# Patient Record
Sex: FEMALE
[De-identification: names, ages, dates, MRNs, and addresses within clinical notes are randomized per-mention and may not be internally consistent; named-entity substitution may affect disease eponyms.]

---

## 2017-09-26 ENCOUNTER — HOSPITAL ENCOUNTER (EMERGENCY)
Dept: HOSPITAL 42 - ED | Age: 67
Discharge: ANOTHER HOSPITAL | End: 2017-09-26
Payer: MEDICARE

## 2017-09-26 VITALS — RESPIRATION RATE: 18 BRPM

## 2017-09-26 VITALS — OXYGEN SATURATION: 95 %

## 2017-09-26 VITALS — TEMPERATURE: 98.1 F

## 2017-09-26 VITALS — SYSTOLIC BLOOD PRESSURE: 152 MMHG | DIASTOLIC BLOOD PRESSURE: 79 MMHG | HEART RATE: 58 BPM

## 2017-09-26 VITALS — BODY MASS INDEX: 29.9 KG/M2

## 2017-09-26 DIAGNOSIS — R55: Primary | ICD-10-CM

## 2017-09-26 DIAGNOSIS — I10: ICD-10-CM

## 2017-09-26 DIAGNOSIS — R00.2: ICD-10-CM

## 2017-09-26 LAB
ALBUMIN/GLOB SERPL: 1.5 {RATIO} (ref 1.1–1.8)
ALP SERPL-CCNC: 62 U/L (ref 38–126)
ALT SERPL-CCNC: 23 U/L (ref 7–56)
APTT BLD: 27 SECONDS (ref 23.7–30.8)
AST SERPL-CCNC: 18 U/L (ref 14–36)
BILIRUB SERPL-MCNC: 0.4 MG/DL (ref 0.2–1.3)
BUN SERPL-MCNC: 24 MG/DL (ref 7–21)
CALCIUM SERPL-MCNC: 9.1 MG/DL (ref 8.4–10.5)
CHLORIDE SERPL-SCNC: 106 MMOL/L (ref 98–107)
CO2 SERPL-SCNC: 25 MMOL/L (ref 21–33)
ERYTHROCYTE [DISTWIDTH] IN BLOOD BY AUTOMATED COUNT: 13.1 % (ref 11.5–14.5)
GLOBULIN SER-MCNC: 2.6 GM/DL
GLUCOSE SERPL-MCNC: 97 MG/DL (ref 70–110)
HCT VFR BLD CALC: 39.6 % (ref 36–48)
INR PPP: 0.94 (ref 0.93–1.08)
MCH RBC QN AUTO: 30.4 PG (ref 25–35)
MCHC RBC AUTO-ENTMCNC: 34.3 G/DL (ref 31–37)
MCV RBC AUTO: 88.6 FL (ref 80–105)
PLATELET # BLD: 239 10^3/UL (ref 120–450)
PMV BLD AUTO: 9.8 FL (ref 7–11)
POTASSIUM SERPL-SCNC: 4 MMOL/L (ref 3.6–5)
PROT SERPL-MCNC: 6.4 G/DL (ref 5.8–8.3)
SODIUM SERPL-SCNC: 140 MMOL/L (ref 132–148)
TROPONIN I SERPL-MCNC: < 0.01 NG/ML
WBC # BLD AUTO: 7.6 10^3/UL (ref 4.5–11)

## 2017-09-26 NOTE — CON
DATE:  09/26/2017



INDICATIONS:  Near syncope, weakness, palpitations.



HISTORY OF PRESENT ILLNESS:  This is a 66-year-old woman known to me

who developed weakness and near-syncope yesterday while working in a hot

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

She felt diaphoretic and despite drinking water, her symptoms

worsened.  She took a cab home.  She continued to feel lightheaded and

weak.  She came to the emergency room and was admitted.  She received

IV fluids and this morning she feels much better.  There was no chest

pain.  There was palpitation.  There was no orthopnea, PND, syncope,

fall, injury, edema, claudication, fever, chills, cough, sputum

production, hemoptysis, abdominal pain, nausea, vomiting, diarrhea,

constipation, or melena.



PAST MEDICAL HISTORY:  Notable for hypertension.  There is no history

of rheumatic fever, myocardial infarction, angina, congestive heart

failure, diabetes, stroke, TIA, or gout.



MEDICATIONS AT THE TIME OF ADMISSION:  Amlodipine.



ALLERGIES:  SULFA MEDICATIONS.



SOCIAL HISTORY:  She lives at home.  She is employed.  She is

ambulatory.  She never smoked.  She does not drink alcohol

significantly.



REVIEW OF SYSTEMS:  10-point review of systems is otherwise

unremarkable except as noted above.



PHYSICAL EXAMINATION:

GENERAL:  She is a well-developed woman, in no acute distress.

VITAL SIGNS:  Notable for sinus rhythm 56 beats per minute, blood

pressure 154/88, respirations 18 to 25, and O2 sat 95-96% on room air.

HEENT:  Exam reveals no neck vein distention, thyromegaly, or carotid

bruits.  Mucous membranes are moist.  Conjunctivae are pink.

NECK:  Supple.

RESPIRATORY:  Lung fields clear throughout.

CARDIOVASCULAR:  Examination of the heart reveals normal first and

second heart sounds.

ABDOMEN:  Soft.  Bowel sounds present.  No mass, organomegaly,

tenderness, rebound, or guarding.  No CVA tenderness.  No palpable

abdominal aortic aneurysm.

EXTREMITIES:  Exam revealed no cyanosis, clubbing, or edema.

NEUROLOGIC:  She was awake, alert, and oriented.

PSYCHIATRIC:  Normal as to mood and affect.

SKIN:  Warm and dry.  No rash or cellulitis.



LABORATORY AND IMAGING:  A portable chest x-ray revealed no active

disease.  A CT scan of the head is noted.  No evidence of acute

stroke.  EKG demonstrates sinus rhythm.  T-here is a limb lead

reversal.  No acute ST changes are noted.  CBC is unremarkable.  PT,

INR, PTT unremarkable.  Comprehensive metabolic panel unremarkable. 

, troponin less than 0.01.



IMPRESSION AND PLAN:  The patient is a 66-year-old woman who was

exposed to extreme heat in workplace yesterday and developed

diaphoresis, weakness, and near-syncope with palpitations but no chest

pain.  Symptoms have resolved with an overnight stay in the emergency

room, IV fluids and blood tests are unremarkable.  There is no

evidence of arrhythmia or acute myocardial infarction.



Her initial EKG was benign, but there is limb lead reversal.  I will

repeat the EKG this morning.  I will check her for postural signs.  I

will review her old records.  She will report additional symptoms if

they occur.  She will avoid heat exposure and keep well hydrated.  She

is reluctant to have a stress test, but I will review her records to

see if one is advisable and I will discuss it further with you on an

outpatient basis.







__________________________________________

Jeff Munson MD





DD:  09/26/2017 10:10:43

DT:  09/26/2017 11:25:29

Job # [id number]



MTDD

## 2017-09-26 NOTE — CT
EXAM:

  CT Head Without Intravenous Contrast



CLINICAL HISTORY:

  66 years old, female; Signs and symptoms; Syncope and collapse; Additional 

info: Near syncope



TECHNIQUE:

  Axial computed tomography images of the head/brain without intravenous 

contrast.  All CT scans at this facility use one or more dose reduction 

techniques, viz.: automated exposure control; ma/kV adjustment per patient size 

(including targeted exams where dose is matched to indication; i.e. head); or 

iterative reconstruction technique.



COMPARISON:

  No relevant prior studies available.



FINDINGS:

  Brain:  Mild atrophy.  No intracranial hemorrhage.  No mass.  No definite 

edema.

  Ventricles:  No hydrocephalus.

  Bones/joints:  No acute fracture.

  Soft tissues:  Unremarkable.

  Vasculature:  Mild atherosclerotic disease of intracranial arteries.

  Sinuses:  Few sphenoid retention cysts.

  Mastoid air cells:  No mastoid effusion.

  Orbits:  Unremarkable as visualized.



IMPRESSION:     

1.  No acute intracranial abnormality.  Acute infarction may be CT occult 

within first 24 hours.  If a focal deficit persists, consider followup CT or 

MRI for further evaluation.

2.  Incidental/non-acute findings are described above.

## 2017-09-26 NOTE — CARD
--------------- APPROVED REPORT --------------





EKG Measurement

Heart [id number]

OH [id number]

[id number]

[id number]

[id number]



<Conclusion>

Sinus bradycardia

Leftward axis

NSSTW changes

## 2017-09-26 NOTE — CARD
--------------- APPROVED REPORT --------------





EKG Measurement

Heart [id number]

[id number]

[id number]

[id number]

[id number]



<Conclusion>

*** Suspect arm lead reversal.



Normal sinus rhythm

## 2017-09-26 NOTE — CON
Went to see the patient who was admitted with near syncope.  The patient

said that recently she started seeing Dr. Munson, used to see before Dr. Lujan, but now recently, she saw Dr. Munson, couple of months and an echo

and a stress test are scheduled with him, so we will sign off consult for

her and we will put Dr. Munson for the consult.  We ordered blood test

earlier, we will cancel and echo will be canceled and further

recommendation as per Dr. Munson.







__________________________________________

Janeth Alcantara MD





DD:  09/26/2017 8:09:20

DT:  09/26/2017 8:22:38

Job # [id number]

## 2017-09-26 NOTE — ED PDOC
Arrival/HPI





- General


Chief Complaint: Weakness/Neurological Deficit


Time Seen by Provider: 09/26/17 00:21


Historian: Patient





- History of Present Illness


Narrative History of Present Illness (Text): 


09/26/17 00:26


Eielen Henson is a 66 year old female, whose past medical history includes 

hypertension, who presents to the Emergency department complaining of 

generalized weakness. Patient states she was in a hot room at work yesterday 

and began feeling weak and near syncopal with palpitations while driving home 

from work. Patient states today she still feels weak with associated 

generalized malaise and headache. Patient denies any fever, chills, chest pain, 

shortness of breath, vomiting, diarrhea, urinary symptoms, back pain, neck pain

, dizziness, or any other complaints.





PMD: Dr. Art


Symptom Onset: Gradual


Symptom Course: Unchanged


Activities at Onset: Light


Context: Home





Past Medical History





- Provider Review


Nursing Documentation Reviewed: Yes





- Infectious Disease


Hx of Infectious Diseases: None





- Tetanus Immunization


Tetanus Immunization: Unknown





- Cardiac


Hx Hypertension: Yes





- Musculoskeletal/Rheumatological


Hx Falls: No





- Gastrointestinal


Hx Gastrointestinal Disorders: Yes (colitis)


Hx Diverticulitis: Yes





- Psychiatric


Hx Psychophysiologic Disorder: No


Hx Anxiety: No


Hx Bipolar Disorder: No


Hx Depression: No


Hx Emotional Abuse: No


Hx Hallucinations: No


Hx Panic Disorder: No


Hx Post Traumatic Stress Disorder: No


Hx Psychosis: No


Hx Physical Abuse: No


Hx Schizophrenia: No


Hx Sexual Abuse: No


Hx Substance Use: No





- Surgical History


Hx Hysterectomy: Yes





- Anesthesia


Hx Anesthesia: No


Hx Anesthesia Reactions: No


Hx Malignant Hyperthermia: No





- Suicidal Assessment


Feels Threatened In Home Enviroment: No





Family/Social History





- Physician Review


Nursing Documentation Reviewed: Yes


Family/Social History: Unknown Family HX


Smoking Status: Never Smoked


Hx Alcohol Use:  (FORMER)


Hx Substance Use: No


Hx Substance Use Treatment: No





Allergies/Home Meds


Allergies/Adverse Reactions: 


Allergies





Sulfa (Sulfonamide Antibiotics) Allergy (Verified 03/12/16 10:49)


 ANAPHYLAXIS








Home Medications: 


 Home Meds











 Medication  Instructions  Recorded  Confirmed


 


Amlodipine Besylate 5 mg PO DAILY 05/29/12 09/26/17














Review of Systems





- Physician Review


All systems were reviewed & negative as marked: Yes





- Review of Systems


Constitutional: Other (+weakness).  absent: Fevers


Eyes: Normal


ENT: Normal


Respiratory: Normal.  absent: SOB, Cough


Cardiovascular: Palpitations, Other (+near-syncopal).  absent: Chest Pain


Gastrointestinal: Normal.  absent: Abdominal Pain, Nausea, Vomiting


Genitourinary Female: Normal.  absent: Dysuria, Frequency, Hematuria, Urine 

Output Changes


Musculoskeletal: Normal.  absent: Back Pain, Neck Pain


Skin: Normal.  absent: Rash


Neurological: Headache.  absent: Dizziness


Endocrine: Normal


Hemo/Lymphatic: Normal


Psychiatric: Normal





Physical Exam


Vital Signs Reviewed: Yes


Vital Signs











  Temp Pulse Resp BP Pulse Ox


 


 09/26/17 00:20  98.2 F  60  16  147/78  96











Temperature: Afebrile


Blood Pressure: Normal


Pulse: Regular


Respiratory Rate: Normal


Appearance: Positive for: Well-Appearing, Non-Toxic, Comfortable


Pain Distress: None


Mental Status: Positive for: Alert and Oriented X 3





- Systems Exam


Head: Present: Atraumatic, Normocephalic


Pupils: Present: PERRL


Extroacular Muscles: Present: EOMI


Conjunctiva: Present: Normal


Mouth: Present: Moist Mucous Membranes


Neck: Present: Normal Range of Motion.  No: Meningeal Signs, MIDLINE TENDERNESS

, Paraspinal Tenderness


Respiratory/Chest: Present: Clear to Auscultation, Good Air Exchange.  No: 

Respiratory Distress, Accessory Muscle Use


Cardiovascular: Present: Regular Rate and Rhythm, Normal S1, S2.  No: Murmurs


Abdomen: Present: Normal Bowel Sounds.  No: Tenderness, Distention, Peritoneal 

Signs


Back: Present: Normal Inspection


Upper Extremity: Present: Normal Inspection.  No: Cyanosis, Edema


Lower Extremity: Present: Normal Inspection.  No: Edema


Neurological: Present: GCS=15, CN II-XII Intact, Speech Normal, Motor Func 

Grossly Intact, Normal Sensory Function, Normal Cerebellar Funct, Memory Normal


Skin: Present: Warm, Dry, Normal Color.  No: Rashes


Psychiatric: Present: Alert, Oriented x 3, Normal Insight, Normal Concentration





Medical Decision Making


ED Course and Treatment: 


09/26/17 00:26


Impression:


66 year old female complaining of generalized weakness, near-syncope, 

palpitations, and headache. 





Plan:


-- CT Head w/o contrast


-- EKG


-- Chest X-ray


-- Labs, cardiac enzymes


-- Reassess and disposition





Prior Visits:


Notes and results from previous visits were reviewed.


On 02/26/2017, pt was seen in the Emergency department for abdominal cramping, 

nausea, and diarrhea. Pt was admitted to the hospital for further evaluation.





Progress Notes:


Reviewed EKG, NSR at 64 bpm. Non-specific ST/T wave changes.





09/26/17 01:58


Reviewed radiology, Chest X-ray shows no acute processes.


CT Head shows:


Brain: Mild atrophy. No intracranial hemorrhage. No mass. No definite edema.


Ventricles: No hydrocephalus.


Bones/joints: No acute fracture.


Soft tissues: Unremarkable.


Vasculature: Mild atherosclerotic disease of intracranial arteries.


Sinuses: Few sphenoid retention cysts.


Mastoid air cells: No mastoid effusion.


Orbits: Unremarkable as visualized.


IMPRESSION:


1. No acute intracranial abnormality. Acute infarction may be CT occult within 

first 24 hours. If a


focal deficit persists, consider followup CT or MRI for further evaluation.


2. Incidental/non-acute findings are described above.





09/26/17 02:43


Case discussed with Dr. BEAN Art, who is aware and agrees with plan. 

Accepts pt in to his service. Pt will go to Telemetry observation for near-

syncope and palpitations. Requests Dr. Lujan on consult.


Pt is no acute distress. Discussed results and hospital observation plan with pt

, who is aware and verbalizes understanding.











- Lab Interpretations


Lab Results: 








 09/26/17 01:10 





 09/26/17 00:45 





 Lab Results





09/26/17 01:10: WBC 7.6  D, RBC 4.47, Hgb 13.6, Hct 39.6, MCV 88.6, MCH 30.4, 

MCHC 34.3, RDW 13.1, Plt Count 239, MPV 9.8


09/26/17 00:45: Sodium 140, Potassium 4.0, Chloride 106, Carbon Dioxide 25, 

Anion Gap 13, BUN 24 H, Creatinine 0.8, Est GFR (African Amer) > 60, Est GFR (

Non-Af Amer) > 60, Random Glucose 97, Calcium 9.1, Total Bilirubin 0.4, AST 18, 

ALT 23, Alkaline Phosphatase 62, Lactate Dehydrogenase 437, Total Creatine 

Kinase 116, Troponin I < 0.01, Total Protein 6.4, Albumin 3.8, Globulin 2.6, 

Albumin/Globulin Ratio 1.5


09/26/17 00:45: PT 10.1, INR 0.94, APTT 27.0








I have reviewed the lab results: Yes





- RAD Interpretation


Radiology Orders: 








09/26/17 00:27


HEAD W/O CONTRAST [CT] Stat 





09/26/17 00:28


CHEST PORTABLE [RAD] Stat 











: ED Physician, Radiologist





- EKG Interpretation


Interpreted by ED Physician: Yes


Type: 12 lead EKG





- Scribe Statement


The provider has reviewed the documentation as recorded by the Scribe


Tesha Silva





Provider Scribe Attestation:


All medical record entries made by the Scribe were at my direction and 

personally dictated by me. I have reviewed the chart and agree that the record 

accurately reflects my personal performance of the history, physical exam, 

medical decision making, and the department course for this patient. I have 

also personally directed, reviewed, and agree with the discharge instructions 

and disposition.








Disposition/Present on Arrival





- Present on Arrival


Any Indicators Present on Arrival: No


History of DVT/PE: No


History of Uncontrolled Diabetes: No


Urinary Catheter: No


History of Decub. Ulcer: No


History Surgical Site Infection Following: None





- Disposition


Have Diagnosis and Disposition been Completed?: Yes


Diagnosis: 


 Near syncope, Palpitations





Disposition: HOSPITALIZED


Disposition Time: 02:03


Patient Plan: Observation


Patient Problems: 


 Current Active Problems











Problem Status Onset


 


Near syncope Acute  


 


Palpitations Acute  











Condition: STABLE

## 2017-09-26 NOTE — RAD
HISTORY:

palpitations  



COMPARISON:

03/12/2016. 



FINDINGS:



LUNGS:

The lungs are well inflated and clear.



PLEURA:

No significant pleural effusion identified, no pneumothorax apparent.



CARDIOVASCULAR:

Normal.



OSSEOUS STRUCTURES:

No significant abnormalities.



VISUALIZED UPPER ABDOMEN:

Normal.



OTHER FINDINGS:

None.



IMPRESSION:

No active pulmonary disease.

## 2017-09-27 NOTE — HP
One-day admitting history and physical and discharge for one-day stay.



CHIEF COMPLAINT:  Generalized weakness, diaphoresis, and fatigue.



HISTORY OF PRESENT ILLNESS:  This is a 66-year-old woman who works as a

 and yesterday was a very hot day approaching 90 degrees and

she was assigned to classroom with no air conditioning.  She was later

transferred to another room, which also had no air conditioning.  She _____

day, she was very hot, quite diaphoretic, and became increasingly weak. 

When she went home, she tried to cool off, but remained lightheaded, dizzy,

felt her heart racing and so came to the emergency room.  In the ER, she

was afebrile, not tachycardic, but her BUN was elevated.  She was

clinically dry and show signs of heat exhaustion.  She was given IV fluids

with _____ great deal of her reflux symptoms and admitted to the monitor

for several hours overnight.



PAST MEDICAL HISTORY:  Significant for hypertension.  Negative for

diabetes, tuberculosis, asthma, or seizures.  She denies any prior

surgeries.  She was hospitalized once earlier in this year for dehydration

related to gastroenteritis and diarrhea.



SOCIAL HISTORY:  She does not smoke or drink alcohol.



REVIEW OF SYSTEMS:  Otherwise unremarkable.  She works with handicapped

children.  Her son is handicapped as well and she cares for him.  Review of

systems is otherwise unremarkable.



PHYSICAL EXAMINATION:

GENERAL:  The patient was seen in the emergency room slot #18 this Tuesday

at near the noon hour.  She was sitting up in a stretcher eating lunch,

awake, alert, clear and appropriate, actually looking forward to discharged

to home.

HEENT:  Head and neck are unremarkable.  Conjunctivae are pink.  Mucous

membranes are moist.

NECK:  Supple without masses.  Thyroid is not palpable.  There is no JVD. 

No carotid bruits.

LUNGS:  Clear with good aeration.

HEART:  Regular and not tachycardic.

ABDOMEN:  Soft.  Bowel sounds are present.  Nontender.

EXTREMITIES:  Showed no edema.  Good DP and PT pulses were noted.



IMPRESSION:

1.  Heat exhaustion with elevated BUN.

2.  History of hypertension.



PLAN:  The patient has already received overnight fluids and adequate

hydration.  She is eating well, awake, alert, clear, no longer dizzy, vital

signs are stable, and therefore ready to be discharged to home. 

Unfortunately, urinalysis was not done, but she reports dark concentrated

urine.









I advised her to continue to encourage IV fluids, follow up in the office

in few days and I expect she will be ready to return to work soon

thereafter.





__________________________________________

Rahul Art MD





DD:  09/27/2017 7:39:01

DT:  09/27/2017 10:04:07

Job # [id number]

## 2018-11-05 ENCOUNTER — HOSPITAL ENCOUNTER (OUTPATIENT)
Dept: HOSPITAL 42 - ENDO | Age: 68
Discharge: HOME | End: 2018-11-05
Attending: INTERNAL MEDICINE
Payer: MEDICARE

## 2018-11-05 VITALS
SYSTOLIC BLOOD PRESSURE: 127 MMHG | OXYGEN SATURATION: 99 % | DIASTOLIC BLOOD PRESSURE: 75 MMHG | RESPIRATION RATE: 17 BRPM | TEMPERATURE: 97.6 F

## 2018-11-05 VITALS — BODY MASS INDEX: 29.9 KG/M2

## 2018-11-05 VITALS — HEART RATE: 52 BPM

## 2018-11-05 DIAGNOSIS — K57.30: ICD-10-CM

## 2018-11-05 DIAGNOSIS — D12.0: ICD-10-CM

## 2018-11-05 DIAGNOSIS — D13.39: ICD-10-CM

## 2018-11-05 DIAGNOSIS — R19.4: ICD-10-CM

## 2018-11-05 DIAGNOSIS — I10: ICD-10-CM

## 2018-11-05 DIAGNOSIS — K64.8: ICD-10-CM

## 2018-11-05 DIAGNOSIS — D3A.8: Primary | ICD-10-CM

## 2018-11-05 DIAGNOSIS — D12.5: ICD-10-CM

## 2018-11-05 DIAGNOSIS — K62.1: ICD-10-CM

## 2018-11-05 PROCEDURE — 45385 COLONOSCOPY W/LESION REMOVAL: CPT

## 2018-11-05 PROCEDURE — 88305 TISSUE EXAM BY PATHOLOGIST: CPT

## 2018-11-05 PROCEDURE — 45380 COLONOSCOPY AND BIOPSY: CPT

## 2018-12-03 ENCOUNTER — HOSPITAL ENCOUNTER (EMERGENCY)
Dept: HOSPITAL 42 - ED | Age: 68
Discharge: HOME | End: 2018-12-03
Payer: MEDICARE

## 2018-12-03 VITALS — DIASTOLIC BLOOD PRESSURE: 74 MMHG | SYSTOLIC BLOOD PRESSURE: 130 MMHG | HEART RATE: 65 BPM

## 2018-12-03 VITALS — TEMPERATURE: 98 F | OXYGEN SATURATION: 96 %

## 2018-12-03 VITALS — BODY MASS INDEX: 29.8 KG/M2

## 2018-12-03 VITALS — RESPIRATION RATE: 18 BRPM

## 2018-12-03 DIAGNOSIS — I10: ICD-10-CM

## 2018-12-03 DIAGNOSIS — K57.92: Primary | ICD-10-CM

## 2018-12-03 LAB
ALBUMIN SERPL-MCNC: 4.5 G/DL (ref 3–4.8)
ALBUMIN/GLOB SERPL: 1.2 {RATIO} (ref 1.1–1.8)
ALT SERPL-CCNC: 22 U/L (ref 7–56)
APPEARANCE UR: (no result)
AST SERPL-CCNC: 21 U/L (ref 14–36)
BACTERIA #/AREA URNS HPF: (no result) /[HPF]
BASOPHILS # BLD AUTO: 0.02 K/MM3 (ref 0–2)
BASOPHILS NFR BLD: 0.2 % (ref 0–3)
BILIRUB UR-MCNC: (no result) MG/DL
BUN SERPL-MCNC: 12 MG/DL (ref 7–21)
CALCIUM SERPL-MCNC: 9.4 MG/DL (ref 8.4–10.5)
COLOR UR: YELLOW
EOSINOPHIL # BLD: 0.2 10*3/UL (ref 0–0.7)
EOSINOPHIL NFR BLD: 2.4 % (ref 1.5–5)
ERYTHROCYTE [DISTWIDTH] IN BLOOD BY AUTOMATED COUNT: 13.2 % (ref 11.5–14.5)
GFR NON-AFRICAN AMERICAN: > 60
GLUCOSE UR STRIP-MCNC: NEGATIVE MG/DL
GRANULOCYTES # BLD: 6 10*3/UL (ref 1.4–6.5)
GRANULOCYTES NFR BLD: 67.9 % (ref 50–68)
HGB BLD-MCNC: 15.2 G/DL (ref 12–16)
LEUKOCYTE ESTERASE UR-ACNC: (no result) LEU/UL
LIPASE SERPL-CCNC: 30 U/L (ref 23–300)
LYMPHOCYTES # BLD: 2 10*3/UL (ref 1.2–3.4)
LYMPHOCYTES NFR BLD AUTO: 22.4 % (ref 22–35)
MCH RBC QN AUTO: 30.6 PG (ref 25–35)
MCHC RBC AUTO-ENTMCNC: 34.4 G/DL (ref 31–37)
MCV RBC AUTO: 88.9 FL (ref 80–105)
MONOCYTES # BLD AUTO: 0.6 10*3/UL (ref 0.1–0.6)
MONOCYTES NFR BLD: 7.1 % (ref 1–6)
PH UR STRIP: 6 [PH] (ref 4.7–8)
PLATELET # BLD: 267 10^3/UL (ref 120–450)
PMV BLD AUTO: 9.5 FL (ref 7–11)
PROT UR STRIP-MCNC: 30 MG/DL
RBC # BLD AUTO: 4.97 10^6/UL (ref 3.5–6.1)
RBC # UR STRIP: (no result) /UL
RBC #/AREA URNS HPF: (no result) /HPF (ref 0–2)
SP GR UR STRIP: 1.02 (ref 1–1.03)
UROBILINOGEN UR STRIP-ACNC: 0.2 E.U./DL
WBC # BLD AUTO: 8.8 10^3/UL (ref 4.5–11)

## 2018-12-03 PROCEDURE — 85025 COMPLETE CBC W/AUTO DIFF WBC: CPT

## 2018-12-03 PROCEDURE — 80053 COMPREHEN METABOLIC PANEL: CPT

## 2018-12-03 PROCEDURE — 74177 CT ABD & PELVIS W/CONTRAST: CPT

## 2018-12-03 PROCEDURE — 81001 URINALYSIS AUTO W/SCOPE: CPT

## 2018-12-03 PROCEDURE — 71045 X-RAY EXAM CHEST 1 VIEW: CPT

## 2018-12-03 PROCEDURE — 87086 URINE CULTURE/COLONY COUNT: CPT

## 2018-12-03 PROCEDURE — 83690 ASSAY OF LIPASE: CPT

## 2018-12-03 PROCEDURE — 99285 EMERGENCY DEPT VISIT HI MDM: CPT

## 2018-12-03 NOTE — RAD
Date of service: 



12/03/2018



HISTORY:

 abd pain 



COMPARISON:

09/26/2017 



FINDINGS:



LUNGS:

No active pulmonary disease.



PLEURA:

No significant pleural effusion identified, no pneumothorax apparent.



CARDIOVASCULAR:

No aortic atherosclerotic calcification present.



Normal cardiac size. No pulmonary vascular congestion. 



OSSEOUS STRUCTURES:

No significant abnormalities.



VISUALIZED UPPER ABDOMEN:

Normal.



OTHER FINDINGS:

None.



IMPRESSION:

No active disease.

## 2018-12-03 NOTE — ED PDOC
Arrival/HPI





- General


Chief Complaint: Abdominal Pain


Time Seen by Provider: 12/03/18 09:13


Historian: Patient





- History of Present Illness


Narrative History of Present Illness (Text): 





12/03/18 10:42


60-year-old female presents today with a 2 day history of lower abdominal 

cramping pain. Patient denies nausea vomiting diarrhea or constipation. She 

denies fevers or chills. She is complaining of dark-colored urine but denies dys

uria or urinary frequency. She is complaining of slight right-sided flank pain. 

No medications have been taken for pain at home. His no chest pain or shortness 

of breath. Patient denies dizziness or weakness. Patient was seen by her GI 

doctor today and was advised to come to the emergency room for evaluation as the

patient has a history of diverticulitis.


Severity Level: Mild





Past Medical History





- Provider Review


Nursing Documentation Reviewed: Yes





- Travel History


Have you recently traveled outside [place] w/in the past 3 mons?: No





- Infectious Disease


Hx of Infectious Diseases: None





- Tetanus Immunization


Tetanus Immunization: Unknown





- Cardiac


Hx Hypertension: Yes


Hx Pacemaker: No





- Pulmonary


Hx Respiratory Disorders: No





- Neurological


Hx Neurological Disorder: No


Hx Paralysis: No





- HEENT


Hx HEENT Disorder: No





- Renal


Hx Renal Disorder: No





- Endocrine/Metabolic


Hx Endocrine Disorders: No





- Hematological/Oncological


Hx Blood Disorders: No


Hx Blood Transfusions: No


Hx Blood Transfusion Reaction: No





- Integumentary


Hx Dermatological Disorder: No





- Musculoskeletal/Rheumatological


Hx Musculoskeletal Disorders: No





- Gastrointestinal


Hx Gastrointestinal Disorders: Yes (colitis)


Hx Diverticulitis: Yes





- Genitourinary/Gynecological


Hx Genitourinary Disorders: No





- Psychiatric


Hx Psychophysiologic Disorder: No


Hx Emotional Abuse: No


Hx Physical Abuse: No


Hx Substance Use: No





- Surgical History


Hx Hysterectomy: Yes





- Anesthesia


Hx Anesthesia: Yes


Hx Anesthesia Reactions: No (NAUSEA)


Hx Malignant Hyperthermia: No





- Suicidal Assessment


Feels Threatened In Home Enviroment: No





Family/Social History





- Physician Review


Nursing Documentation Reviewed: Yes


Family/Social History: Unknown Family HX


Smoking Status: Never Smoked


Hx Alcohol Use: Yes ( ON OCCASION)


Hx Substance Use: No


Hx Substance Use Treatment: No





Allergies/Home Meds


Allergies/Adverse Reactions: 


Allergies





metronidazole Allergy (Verified 12/03/18 08:52)


   BECAME TOXIC


Sulfa (Sulfonamide Antibiotics) Allergy (Verified 12/03/18 08:52)


   ANAPHYLAXIS








Home Medications: 


                                    Home Meds











 Medication  Instructions  Recorded  Confirmed


 


amLODIPine [Norvasc] 5 mg PO DAILY 01/08/18 12/03/18


 


Ergocalciferol (Vitamin D2) 50,000 unit PO Q7D 10/31/18 12/03/18





[Vitamin D2]   


 


Calcium Carbonate [Calcium] 500 mg PO DAILY 11/05/18 12/03/18














Review of Systems





- Review of Systems


Constitutional: absent: Fatigue, Fevers


Respiratory: absent: SOB, Cough


Cardiovascular: absent: Chest Pain, Palpitations


Gastrointestinal: Abdominal Pain.  absent: Constipation, Diarrhea, Nausea, 

Vomiting


Genitourinary Female: absent: Dysuria, Frequency, Hematuria


Musculoskeletal: Back Pain.  absent: Arthralgias, Neck Pain


Skin: absent: Rash, Pruritis


Neurological: absent: Headache, Dizziness


Endocrine: absent: Diaphoresis


Psychiatric: absent: Anxiety, Depression





Physical Exam


Vital Signs Reviewed: Yes





Vital Signs











  Temp Pulse Resp BP Pulse Ox


 


 12/03/18 08:56  98 F  64  16  134/90  96











Temperature: Afebrile


Blood Pressure: Normal


Pulse: Regular


Respiratory Rate: Normal


Appearance: Positive for: Well-Appearing, Non-Toxic, Comfortable


Pain Distress: None


Mental Status: Positive for: Alert and Oriented X 3





- Systems Exam


Head: Present: Atraumatic


Mouth: Present: Moist Mucous Membranes


Neck: Present: Normal Range of Motion


Respiratory/Chest: Present: Clear to Auscultation


Cardiovascular: Present: Regular Rate and Rhythm, Normal S1, S2.  No: Murmurs


Abdomen: No: Tenderness, Distention, Peritoneal Signs





Medical Decision Making


ED Course and Treatment: 





12/03/18 13:20


Patient is nontoxic well appearing with stable vital signs presenting with lower

 abdominal pain.





CBC wnl


CMP wnl


Lipase wnl





Urinalysis + leukocytes





CAT scan: FINDINGS:


LOWER THORAX:


Unremarkable. 


LIVER:


Unremarkable. No gross lesion or ductal dilatation. 


GALLBLADDER AND BILE DUCTS:


Unremarkable. 


PANCREAS:


Unremarkable. No gross lesion or ductal dilatation.


SPLEEN:


Small cysts are seen in the spleen.  These are unchanged 


ADRENALS:


Unremarkable. No mass. 


KIDNEYS AND URETERS:


Unremarkable. No hydronephrosis. No solid mass. 


VASCULATURE:


Unremarkable. No aortic aneurysm. No aortic atherosclerotic calcification or mu

ral plaque present.


BOWEL:


Minimal inflammatory changes are seen adjacent to the sigmoid colon consistent 

with mild early diverticulitis.  Multiple diverticula are seen.  


APPENDIX:


Normal appendix. 


PERITONEUM:


Unremarkable. No free fluid. No free air. 


LYMPH NODES:


Unremarkable. No enlarged lymph nodes. 


BLADDER:


Unremarkable. 


REPRODUCTIVE:


Hysterectomy 


BONES:


Degenerative changes in the lower lumbar spine 


OTHER FINDINGS:


None.


IMPRESSION:


Minimal inflammatory changes are seen adjacent to the sigmoid colon consistent 

with mild early diverticulitis.  Multiple diverticula are seen.





Patient reassessment: pt non toxic well appearing; no distress. 





case discussed with dr. Alvarez; will d/c home on augmentin. pt to f/u with GI,

 surgery and PMD. pt states she was told by dr. alvarez that there was a lesion

 on her Colonscopy that wasnt seen on todays cat scan. pt was advised of 

possible cancerous lesion. pt was advised to f/u with surgeon for further 

evaluation. pt verbalized understanding of possible cancerous lesion and need 

for immediate follow up. pt also was advised to return immediately if symptoms, 

worsen,persist or if new symptoms develop. 








Discussed all results with patient in depth








all aspects of this case were discussed the attending of record. 








Impression: diverticulitis


Motrin every 6 hours as needed for pain


Augmentin; 1 tablet twice daily x 10 days


Follow up with primary care physician within the next 2 days


Follow up with the GI specialist within the next 2 days. 


Return immediately if symptoms worsen persist or if new symptoms develop: High 

fevers, increasing pain, vomiting, diarrhea or any other concerning symptoms 

develop





- Lab Interpretations


Lab Results: 











                                 12/03/18 09:54 





                                 12/03/18 09:54 





                                   Lab Results





12/03/18 09:54: WBC 8.8, RBC 4.97, Hgb 15.2, Hct 44.2, MCV 88.9, MCH 30.6, MCHC 

34.4, RDW 13.2, Plt Count 267, MPV 9.5, Gran % 67.9, Lymph % (Auto) 22.4, Mono %

 (Auto) 7.1 H, Eos % (Auto) 2.4, Baso % (Auto) 0.2, Gran # 6.00, Lymph # (Auto) 

2.0, Mono # (Auto) 0.6, Eos # (Auto) 0.2, Baso # (Auto) 0.02


12/03/18 09:54: Sodium 141, Potassium 3.9, Chloride 103, Carbon Dioxide 27, 

Anion Gap 15, BUN 12, Creatinine 0.8, Est GFR (African Amer) > 60, Est GFR (Non-

Af Amer) > 60, Random Glucose 96, Calcium 9.4, Total Bilirubin 0.8, AST 21, ALT 

22, Alkaline Phosphatase 92, Total Protein 8.2, Albumin 4.5, Globulin 3.7, 

Albumin/Globulin Ratio 1.2, Lipase 30


12/03/18 09:54: Urine Color Yellow, Urine Appearance Sl cloudy, Urine pH 6.0, Ur

 Specific Gravity 1.025, Urine Protein 30 H, Urine Glucose (UA) Negative, Urine 

Ketones Trace H, Urine Blood Trace-intact H, Urine Nitrate Negative, Urine 

Bilirubin Small H, Urine Urobilinogen 0.2, Ur Leukocyte Esterase Small H, Urine 

RBC 0 - 2, Urine WBC 2 - 5, Ur Epithelial Cells 1 - 3, Urine Bacteria Small











- RAD Interpretation


Radiology Orders: 











12/03/18 09:38


ABD PELVIS PO & IV CONTRAST [CT] Stat 





12/03/18 09:39


CHEST PORTABLE [RAD] Stat 














Disposition/Present on Arrival





- Present on Arrival


Any Indicators Present on Arrival: No


History of DVT/PE: No


History of Uncontrolled Diabetes: No


Urinary Catheter: No


History of Decub. Ulcer: No


History Surgical Site Infection Following: None





- Disposition


Have Diagnosis and Disposition been Completed?: Yes


Diagnosis: 


 Diverticulitis





Disposition: HOME/ ROUTINE


Disposition Time: 13:00


Patient Plan: Discharge


Patient Problems: 


                             Current Active Problems











Problem Status Onset


 


Diverticulitis Acute 











Condition: GOOD


Discharge Instructions (ExitCare):  Diverticulitis (DC)


Additional Instructions: 


Motrin every 6 hours as needed for pain


Augmentin; 1 tablet twice daily x 10 days


Follow up with primary care physician within the next 2 days


Follow up with the GI specialist within the next 2 days. 


Return immediately if symptoms worsen persist or if new symptoms develop: High 

fevers, increasing pain, vomiting, diarrhea or any other concerning symptoms 

develop


Prescriptions: 


Amoxicillin/Clavulanate [Augmentin 875 MG-125 MG] 1 tab PO BID #20 tab


Referrals: 


Hank Church MD [Family Provider] - Follow up with primary


Gifty Alvarez MD [Medical Doctor] - Follow up with primary


Forms:  [employer] (English), WORK NOTE

## 2018-12-03 NOTE — CT
Date of service: 



12/03/2018



PROCEDURE:  CT Abdomen and Pelvis with contrast



HISTORY:

lower abdominal pain



COMPARISON:

10/16/2015



TECHNIQUE:

Contrast dose: 100 cc of Omni 350



Radiation dose:



Total exam DLP = 895.28 mGy-cm.



This CT exam was performed using one or more of the following dose 

reduction techniques: Automated exposure control, adjustment of the 

mA and/or kV according to patient size, and/or use of iterative 

reconstruction technique.



FINDINGS:



LOWER THORAX:

Unremarkable. 



LIVER:

Unremarkable. No gross lesion or ductal dilatation. 



GALLBLADDER AND BILE DUCTS:

Unremarkable. 



PANCREAS:

Unremarkable. No gross lesion or ductal dilatation.



SPLEEN:

Small cysts are seen in the spleen.  These are unchanged 



ADRENALS:

Unremarkable. No mass. 



KIDNEYS AND URETERS:

Unremarkable. No hydronephrosis. No solid mass. 



VASCULATURE:

Unremarkable. No aortic aneurysm. No aortic atherosclerotic 

calcification or mural plaque present.



BOWEL:

Minimal inflammatory changes are seen adjacent to the sigmoid colon 

consistent with mild early diverticulitis.  Multiple diverticula are 

seen.  



APPENDIX:

Normal appendix. 



PERITONEUM:

Unremarkable. No free fluid. No free air. 



LYMPH NODES:

Unremarkable. No enlarged lymph nodes. 



BLADDER:

Unremarkable. 



REPRODUCTIVE:

Hysterectomy 



BONES:

Degenerative changes in the lower lumbar spine 



OTHER FINDINGS:

None.



IMPRESSION:

Minimal inflammatory changes are seen adjacent to the sigmoid colon 

consistent with mild early diverticulitis.  Multiple diverticula are 

seen.

## 2019-02-11 NOTE — PCM.SURG1
Surgeon's Initial Post Op Note





- Surgeon's Notes


Surgeon: Dr. Guo


Assistant: William Kwon MS3


Type of Anesthesia: General Endo


Anesthesia Administered By: Dr. Montgomery


Pre-Operative Diagnosis: Cecal Carcinoid


Operative Findings: See operative dictation


Post-Operative Diagnosis: Same


Operation Performed: Laparoscopic right hemicolectomy with primary side to side 

anastamoses


Specimen/Specimens Removed: Right colon


Estimated Blood Loss: EBL {In ML}: 20


Blood Products Given: N/A


Drains Used: No Drains


Post-Op Condition: Good


Date of Surgery/Procedure: 02/11/19


Time of Surgery/Procedure: 15:28

## 2019-02-12 NOTE — CP.PCM.PN
Subjective





- Date & Time of Evaluation


Date of Evaluation: 02/12/19


Time of Evaluation: 09:01





- Subjective


Subjective: 





Surgery Progress Note for Dr. Grubbs





S/E at bedside. 


Complained of soreness in right lower quadrant at site of hemicolectomy


Pending bowel movement





Objective





- Vital Signs/Intake and Output


Vital Signs (last 24 hours): 


                                        











Temp Pulse Resp BP Pulse Ox


 


 99.2 F   68   20   118/69   96 


 


 02/12/19 06:49  02/12/19 06:49  02/12/19 06:49  02/12/19 06:49  02/12/19 06:49











- Medications


Medications: 


                               Current Medications





Amlodipine Besylate (Norvasc)  5 mg PO QAM [facility]


Benzocaine/Menthol (Cepacol Sore Throat)  1 derik MT Q4H PRN


   PRN Reason: Sore Throat


   Last Admin: 02/11/19 21:36 Dose:  1 derik


Hydromorphone HCl (Dilaudid)  0.5 mg IVP Q4H PRN


   PRN Reason: Pain, moderate (4-7)


   Last Admin: 02/12/19 05:29 Dose:  0.5 mg


Lactated Ringer's (Lactated Ringer's)  1,000 mls @ 120 mls/hr IV .Q8H20M [facility]


   Last Admin: 02/12/19 01:02 Dose:  120 mls/hr











- Labs


Labs: 


                                        





                                 02/12/19 06:45 





                                 02/12/19 06:45 











- Constitutional


Appears: Non-toxic, No Acute Distress





- Head Exam


Head Exam: NORMAL INSPECTION, NORMOCEPHALIC





- Eye Exam


Eye Exam: EOMI, Normal appearance.  absent: Nystagmus, Scleral icterus





- ENT Exam


ENT Exam: Mucous Membranes Moist





- Respiratory Exam


Respiratory Exam: Clear to Ausculation Bilateral, NORMAL BREATHING PATTERN.  abs

ent: Rales, Rhonchi, Wheezes





- Cardiovascular Exam


Cardiovascular Exam: REGULAR RHYTHM, +S1, +S2.  absent: Tachycardia





- GI/Abdominal Exam


GI & Abdominal Exam: Soft, Normal Bowel Sounds.  absent: Distended, Firm, 

Guarding, Rigid


Additional comments: 





umbilical region incision noted, well healing with glue


left lower quadrant incision noted, well healing, no oozing, erythema, or 

drainage noted





Assessment and Plan





- Assessment and Plan (Free Text)


Assessment: 





Patient is a 68 y.po male w/ PMH of HTN and HLD admitted for laparoscopic right 

hemicolectomy with primary side to side anastamoses. POD 1





PLAN:


Encourage incentive spiromtry use


Anti-embolic stockings


OOB to chair, activity as tolerated; PT eval pending


Monitor urine output s/p mcguire removal; monitor bowel function 


CLD, ADAT


Possible D/C in AM if patient tolerates feeds and has bowel movement

## 2019-02-13 NOTE — CP.PCM.PN
Subjective





- Date & Time of Evaluation


Date of Evaluation: 02/13/19


Time of Evaluation: 10:00





- Subjective


Subjective: 


General Surgery Progress Note for Dr. Guo





This 68F was seen and examined this Am at bedside she reports that her pain is 

improving. She tolerated liquids however she reports diarrhea. She denies chest 

pain or SOB or any new or concerning symptoms. Incisions are well approximated 

non tender non erythematous. She is requesting a regular diet. 








Objective





- Vital Signs/Intake and Output


Vital Signs (last 24 hours): 


                                        











Temp Pulse Resp BP Pulse Ox


 


 97.4 F L  68   18   153/83 H  95 


 


 02/13/19 06:00  02/13/19 09:44  02/13/19 06:00  02/13/19 09:44  02/13/19 06:00








Intake and Output: 


                                        











 02/13/19 02/13/19





 06:59 18:59


 


Intake Total 2380 240


 


Output Total 2 


 


Balance 2378 240














- Medications


Medications: 


                               Current Medications





Acetaminophen (Tylenol 325mg Tab)  650 mg PO Q6H PRN


   PRN Reason: Fever >100.4 F


Amlodipine Besylate (Norvasc)  5 mg PO QAM [facility]


   Last Admin: 02/13/19 09:44 Dose:  5 mg


Benzocaine/Menthol (Cepacol Sore Throat)  1 derik MT Q4H PRN


   PRN Reason: Sore Throat


   Last Admin: 02/12/19 09:50 Dose:  1 derik


Lactated Ringer's (Lactated Ringer's)  1,000 mls @ 120 mls/hr IV .Q8H20M [facility]


   Last Admin: 02/13/19 12:52 Dose:  120 mls/hr


Ketorolac Tromethamine (Toradol)  15 mg IVP Q6 [facility]











- Labs


Labs: 


                                        





                                 02/12/19 06:45 





                                 02/12/19 06:45 











- Constitutional


Appears: Non-toxic, No Acute Distress





- Head Exam


Head Exam: ATRAUMATIC, NORMOCEPHALIC





- Eye Exam


Eye Exam: EOMI





- ENT Exam


ENT Exam: Mucous Membranes Moist





- Respiratory Exam


Respiratory Exam: NORMAL BREATHING PATTERN





- Cardiovascular Exam


Cardiovascular Exam: +S1, +S2





- GI/Abdominal Exam


GI & Abdominal Exam: Soft.  absent: Distended, Guarding, Rigid, Tenderness





- Neurological Exam


Neurological Exam: Alert, Awake





- Psychiatric Exam


Psychiatric exam: Normal Affect, Normal Mood





- Skin


Skin Exam: Dry, Intact





Assessment and Plan





- Assessment and Plan (Free Text)


Assessment: 


This is a 68F with HTN and HLD POD#2 s/p laparoscopic right hemicolectomy with 

primary side to side anastamoses for carcinoid.





PLAN:


Encourage incentive spiromtry use


Lovenox


Ambulate


monitor bowel function 


Regular diet


Possible D/C in AM if patient tolerates feeds and has bowel movement





Further recs per Dr. Brant Perry PGY3

## 2019-02-13 NOTE — OP
PROCEDURE DATE:  02/11/2019



PREOPERATIVE DIAGNOSIS:  Carcinoid tumor of the terminal ileum.



POSTOPERATIVE DIAGNOSIS:  Carcinoid tumor of the terminal ileum.



PROCEDURE PERFORMED:  Laparoscopic right hemicolectomy.



SURGEON:  Kwadwo Guo MD



ASSISTANT:  Dominic Perry DO



ANESTHESIOLOGIST:  Dr. Montgomery.



TYPE OF ANESTHESIA:  General endotracheal anesthesia.



ESTIMATED BLOOD LOSS:  Minimal.



SPECIMEN:  Right colon with terminal ileum with mesentery.



INDICATIONS:  The patient is a 68-year-old female with findings of

carcinoid tumor, terminal ileum.  The patient had a workup, looking for

additional lesions which were not present and was scheduled for right

hemicolectomy.



DESCRIPTION OF PROCEDURE:  The patient was brought to the operating room,

placed on the operating room table in supine position.  The patient was

connected to EKG, blood pressure, and pulse oximetry monitors.  The patient

then underwent general endotracheal anesthesia and was prepped and draped

in the usual sterile fashion.



First, a standard time-out procedure took place, when everybody in the room

agreed as to the patient's diagnosis, procedure to be performed, and

identity.



First a local anesthetic was used in order to anesthetize the area, just

adjacent to the umbilicus for placement of the 10 mm trocar.  We then

infiltrated two other areas one below the umbilicus and one above the

umbilicus lateral to the left rectus muscle edge.  Once those were marked,

I then proceeded with making incision and placing the 10 mm trocar inside 

the abdominal cavity after pneumoperitoneum was obtained with Veress

needle.  Careful evaluation of the abdominal cavity revealed presence of

small bowel throughout the entire abdomen, few adhesions of the omentum to

the right colon as well as the _____ colon.  We then placed two 5 mm

trocars in the lateral border of the left rectus muscle and proceeded with

careful mobilization.  The patient was placed in Trendelenburg position and

tilted to the left.  It allowed me to move the small bowel away from the

root of the right colon mesentery and the area of the ileocecal valve was

carefully evaluated.  There was no evidence of any growth in the area.  I

then proceeded with taking down few omental adhesions using the harmonic

scalpel and pushed the omentum up and above the transverse colon.  In this

way, the area of the ileocolic artery was exposed which was then pulled

laterally in order to raise it up.  The origin of the ileocolic artery was

identified, just below the edge of the duodenum and the fat was scored and

cleaned off the artery and vein.  Both structures were carefully dissected

out and freed up from the surrounding tissue and clipped both proximally

and distally and transected using Harmonic scalpel.  Once the artery and

vein were detached, the adjacent fat is also cauterized with Harmonic

scalpel.  I then proceeded with raising the right colon omentum carefully

by getting into the appropriate plain and  from the underlying

structures.  Careful dissection was done in order to mobilize the mesentery

device; all the way down to the peritoneal border on the other side of the

colon.  On the way, it was noted that traversing ureter with adjacent

structures and kept it away from the field of dissection.  The mesentery of

the terminal ileum was also mobilized partially and then the proximal

portion of the transverse colon of the mesentery was also mobilized off the

duodenum.  Once this was done, we then proceeded with mobilizing the

proximal transverse colon by transecting the hepatocolic ligament and freed

up the ascending colon by detaching it from the lateral abdominal wall.  In

this fashion, we were able to fully mobilize the terminal ileum, right

colon, and proximal transverse colon.  Mesentery of the transverse colon

was carefully cleaned up all the way down to the wall and noted to be able

to proceed with the anastomosis after the specimen was extracted.  We then

proceeded with making a small incision about 6 cm underlying port placement

into the periumbilical area in a vertical fashion, and the incision was

then covered and _____, and the specimen was carefully brought out by the

cecum to the outside.  The previously marked colon and the terminal ileum

were approximated to each other and 3-0 silk stitch was employed to touch

them in an appropriate place and about 15 cm of terminal ileum were then

transected using a KERA stapler and so was the transverse colon in the

proximal portion.  The specimen was then moved to the side table and

anastomosis in the transverse colon and terminal ileum was created using

KERA and TA staplers.  Once this was located, a stitch was placed in the

crotch of the anastomosis of 3-0 silk.  There was no bleeding noted.  The

bowel was viable and it was placed back in the abdominal cavity.  Once this

was done, we then proceeded with closure of small midline incision using #1

PDS in a running fashion.  Once this was closed and subcutaneous tissues

were closed, after the wound was copiously irrigated, we then placed a

pneumoperitoneum back in and looked with 5-mm scope through the 5-mm ports.

Careful evaluation of the abdominal cavity revealed no evidence of

bleeding.  The bowel was positioned appropriately.  There was no evidence

of any twisting.  The omentum was placed back on top of the anastomoses,

and at this point, the ports were checked for bleeding.  There was no

evidence of any bleeding from port sites and the ports were removed after

pneumoperitoneum was released, and the wounds were closed using 3-0 Vicryl

for the subcutaneous tissue and 4-0 Monocryl for skin.  Sterile Dermabond

dressing was applied to all the wounds.  The patient tolerated the

procedure well and there were no complications.  The patient was then

awakened and transferred to the recovery room for further observation.

 



__________________________________________

Kwadwo Guo MD





DD:  02/12/2019 17:41:26

DT:  02/12/2019 22:56:32

Job # [id number]

## 2019-02-14 NOTE — CP.PCM.PN
Subjective





- Date & Time of Evaluation


Date of Evaluation: 02/14/19


Time of Evaluation: 07:15





- Subjective


Subjective: 


Chinmay Jim, PGY-1 Progress Note for Dr. Guo, covered by Dr. Painter





Patient seen and evaluated at bedside. No acute events reported overnight other 

than continuing episodes of diarrhea. Patient has been eating crackers and has 

slowly regained her appetite to consume a regular diet. Reports flatus and 

urination without issue. 








Objective





- Vital Signs/Intake and Output


Vital Signs (last 24 hours): 


                                        











Temp Pulse Resp BP Pulse Ox


 


 98.5 F   64   18   141/82   92 L


 


 02/13/19 22:00  02/13/19 22:00  02/13/19 22:00  02/13/19 22:00  02/13/19 22:00








Intake and Output: 


                                        











 02/14/19 02/14/19





 06:59 18:59


 


Intake Total 420 


 


Balance 420 














- Medications


Medications: 


                               Current Medications





Acetaminophen (Tylenol 325mg Tab)  650 mg PO Q6H PRN


   PRN Reason: Fever >100.4 F


   Last Admin: 02/13/19 21:05 Dose:  650 mg


Amlodipine Besylate (Norvasc)  5 mg PO QAM [facility]


   Last Admin: 02/13/19 09:44 Dose:  5 mg


Benzocaine/Menthol (Cepacol Sore Throat)  1 derik MT Q4H PRN


   PRN Reason: Sore Throat


   Last Admin: 02/12/19 09:50 Dose:  1 derik


Dextrose/Sodium Chloride (Dextrose 5%/0.45% Ns 1000 Ml)  1,000 mls @ 120 mls/hr 

IV .Q8H20M [facility]


   Last Admin: 02/14/19 01:06 Dose:  Not Given


Ketorolac Tromethamine (Toradol)  15 mg IVP Q6 PRN


   PRN Reason: Pain, moderate (4-7)


Lactobacillus Acidophilus (Bacid Acidophilus)  1 cap PO BID [facility]


   Last Admin: 02/13/19 17:08 Dose:  1 cap











- Labs


Labs: 


                                        





                                 02/14/19 06:55 





                                 02/14/19 06:55 











- Additional Findings


Additional findings: 





- Constitutional


Appears: Non-toxic, No Acute Distress





- Head Exam


Head Exam: ATRAUMATIC, NORMOCEPHALIC





- Eye Exam


Eye Exam: EOMI





- ENT Exam


ENT Exam: Mucous Membranes Moist





- Respiratory Exam


Respiratory Exam: NORMAL BREATHING PATTERN





- Cardiovascular Exam


Cardiovascular Exam: +S1, +S2





- GI/Abdominal Exam


GI & Abdominal Exam: Soft.  Incisions are well approximated without erythema or 

discharge. absent: Distended, Guarding, Rigid, Tenderness





- Neurological Exam


Neurological Exam: Alert, Awake





- Psychiatric Exam


Psychiatric exam: Normal Affect, Normal Mood





- Skin


Skin Exam: Dry, Intact





Assessment and Plan





- Assessment and Plan (Free Text)


Assessment: 





68 F PMHx HTN and HLD POD #3 s/p lap R hemicolectomy with primary anastamosis 

for carcinoid tumor.





- Encourage ambulation, OOB to chair 


- Encourage IS use


- Continue to monitor bowel function, psyllium began, c. diff studies negative


- Persistent diarrhea, cholestyramine started


- Tolerating Regular diet, stopped IVF


- Morning labs noted





Further recs per Dr. Brant Jim, PGY-1

## 2019-02-15 NOTE — CP.PCM.PN
<Bhanu Paz - Last Filed: 02/15/19 12:09>





Subjective





- Date & Time of Evaluation


Date of Evaluation: 02/15/19


Time of Evaluation: 12:06





- Subjective


Subjective: 





GI: Dr. Lambert





Pt seen and examined. She states that diarrhea has decreased in frequency to 

about every 1.5 hrs. She has decreased appetite but is tolerating diet. 





Objective





- Vital Signs/Intake and Output


Vital Signs (last 24 hours): 


                                        











Temp Pulse Resp BP Pulse Ox


 


 98.6 F   64   20   141/80   93 L


 


 02/15/19 06:00  02/15/19 06:00  02/15/19 06:00  02/15/19 06:00  02/15/19 06:00








Intake and Output: 


                                        











 02/15/19 02/15/19





 06:59 18:59


 


Intake Total  120


 


Balance  120














- Medications


Medications: 


                               Current Medications





Acetaminophen (Tylenol 325mg Tab)  650 mg PO Q6H PRN


   PRN Reason: Fever >100.4 F


   Last Admin: 02/15/19 03:49 Dose:  650 mg


Amlodipine Besylate (Norvasc)  5 mg PO QAM [facility]


   Last Admin: 02/15/19 09:09 Dose:  5 mg


Benzocaine/Menthol (Cepacol Sore Throat)  1 derik MT Q4H PRN


   PRN Reason: Sore Throat


   Last Admin: 02/12/19 09:50 Dose:  1 derik


Cholestyramine Resin (Questran)  4 gm PO TID [facility]


   Last Admin: 02/15/19 09:10 Dose:  4 gm


Ketorolac Tromethamine (Toradol)  15 mg IVP Q6 PRN


   PRN Reason: Pain, moderate (4-7)


Lactobacillus Acidophilus (Bacid Acidophilus)  1 cap PO BID [facility]


   Last Admin: 02/15/19 09:09 Dose:  1 cap











- Labs


Labs: 


                                        





                                 02/14/19 06:55 





                                 02/14/19 06:55 











- Constitutional


Appears: Non-toxic, No Acute Distress





- Head Exam


Head Exam: ATRAUMATIC, NORMOCEPHALIC





- Eye Exam


Eye Exam: EOMI





- ENT Exam


ENT Exam: Mucous Membranes Moist





- Neck Exam


Neck Exam: Full ROM





- Respiratory Exam


Respiratory Exam: NORMAL BREATHING PATTERN.  absent: Accessory Muscle Use, 

Respiratory Distress





- GI/Abdominal Exam


GI & Abdominal Exam: Soft, Tenderness (yovany-incisional ).  absent: Distended, 

Firm, Guarding, Rigid, Rebound


Additional comments: 





incisions C/D/I





- Extremities Exam


Extremities Exam: absent: Calf Tenderness, Pedal Edema





- Neurological Exam


Neurological Exam: Alert, Awake, Oriented x3





- Psychiatric Exam


Psychiatric exam: Normal Affect, Normal Mood





- Skin


Skin Exam: Dry, Intact, Warm





Assessment and Plan





- Assessment and Plan (Free Text)


Assessment: 





68F w. HTN and HLD s/p R hemicolectomy POD#4 for carcinoid CA, now w. post-op 

diarrhea, slowly decreasing in frequency


-C. Diff negative 


-c/w pro biotics, bulking agents, and cholestyramine


-will follow


-if no further improvement will consider loperamide 


-d/w attending





Jackson PGY4





<Gifty Lambert - Last Filed: 02/15/19 22:37>





Objective





- Vital Signs/Intake and Output


Vital Signs (last 24 hours): 


                                        











Temp Pulse Resp BP Pulse Ox


 


 98 F   63   20   123/76   94 L


 


 02/15/19 14:00  02/15/19 14:00  02/15/19 14:00  02/15/19 14:00  02/15/19 14:00








Intake and Output: 


                                        











 02/15/19 02/16/19





 18:59 06:59


 


Intake Total 760 360


 


Balance 760 360














- Medications


Medications: 


                               Current Medications





Acetaminophen (Tylenol 325mg Tab)  650 mg PO Q6H PRN


   PRN Reason: Fever >100.4 F


   Last Admin: 02/15/19 03:49 Dose:  650 mg


Amlodipine Besylate (Norvasc)  5 mg PO QAM [facility]


   Last Admin: 02/15/19 09:09 Dose:  5 mg


Benzocaine/Menthol (Cepacol Sore Throat)  1 derik MT Q4H PRN


   PRN Reason: Sore Throat


   Last Admin: 02/12/19 09:50 Dose:  1 derik


Cholestyramine Resin (Questran)  4 gm PO TID [facility]


   Last Admin: 02/15/19 17:04 Dose:  4 gm


Ketorolac Tromethamine (Toradol)  15 mg IVP Q6 PRN


   PRN Reason: Pain, moderate (4-7)


Lactobacillus Acidophilus (Bacid Acidophilus)  1 cap PO BID [facility]


   Last Admin: 02/15/19 17:04 Dose:  1 cap











- Labs


Labs: 


                                        





                                 02/14/19 06:55 





                                 02/14/19 06:55 











Attending/Attestation





- Attestation


I have personally seen and examined this patient.: Yes


I have fully participated in the care of the patient.: Yes


I have reviewed all pertinent clinical information, including history, physical 

exam and plan: Yes


Notes (Text): 


This is an addendum to GI followup report dictated by the Resident. The patient 

was seen and evaluated earlier.  Medical records, lab studies, imagings were 

reviewed.  Last 24 hours events reviewed.  Agreed with the above treatment plan 

as outlined in Resident 's notes with the addition of the following 


02/15/19 22:37

## 2019-02-15 NOTE — CP.PCM.PN
Subjective





- Date & Time of Evaluation


Date of Evaluation: 02/15/19


Time of Evaluation: 06:50





- Subjective


Subjective: 





Surgery Progress note. Dr. Guo. Dr. Inocencio gu. 





Pt seen and examined at bedside. Still reports diarrhea, however, has had some 

improved episodes compared to yesterday, now reports diarrhea every 1-1.5 hour. 

Diarrhea is now more yellow compared to greenish prior. Pain improved and well 

tolerated. No N/V. States that the abdomen is less distended now. No fevers or 

chills. Still reports no appetite. No new complaints.








Objective





- Vital Signs/Intake and Output


Vital Signs (last 24 hours): 


                                        











Temp Pulse Resp BP Pulse Ox


 


 97.8 F   67   18   133/79   95 


 


 02/14/19 14:00  02/14/19 14:00  02/14/19 14:00  02/14/19 14:00  02/14/19 14:00








Intake and Output: 


                                        











 02/15/19 02/15/19





 06:59 18:59


 


Intake Total  120


 


Balance  120














- Medications


Medications: 


                               Current Medications





Acetaminophen (Tylenol 325mg Tab)  650 mg PO Q6H PRN


   PRN Reason: Fever >100.4 F


   Last Admin: 02/15/19 03:49 Dose:  650 mg


Amlodipine Besylate (Norvasc)  5 mg PO QAM [facility]


   Last Admin: 02/14/19 10:44 Dose:  5 mg


Benzocaine/Menthol (Cepacol Sore Throat)  1 derik MT Q4H PRN


   PRN Reason: Sore Throat


   Last Admin: 02/12/19 09:50 Dose:  1 derik


Cholestyramine Resin (Questran)  4 gm PO TID [facility]


Ketorolac Tromethamine (Toradol)  15 mg IVP Q6 PRN


   PRN Reason: Pain, moderate (4-7)


Lactobacillus Acidophilus (Bacid Acidophilus)  1 cap PO BID [facility]


   Last Admin: 02/14/19 18:08 Dose:  Not Given











- Labs


Labs: 


                                        





                                 02/14/19 06:55 





                                 02/14/19 06:55 











- Constitutional


Appears: Well, Non-toxic, No Acute Distress





- Head Exam


Head Exam: ATRAUMATIC, NORMAL INSPECTION, NORMOCEPHALIC





- Eye Exam


Eye Exam: EOMI, Normal appearance.  absent: Scleral icterus





- ENT Exam


ENT Exam: Mucous Membranes Moist





- Respiratory Exam


Respiratory Exam: NORMAL BREATHING PATTERN.  absent: Accessory Muscle Use, 

Respiratory Distress





- Cardiovascular Exam


Cardiovascular Exam: RRR.  absent: Tachycardia, JVD





- GI/Abdominal Exam


GI & Abdominal Exam: Soft.  absent: Distended, Firm, Guarding, Rebound


Additional comments: 





Mild yovany-incisional tenderness. Incisions clean, dry and intact with dermabond 

in place. 





- Extremities Exam


Extremities Exam: Normal Inspection.  absent: Calf Tenderness





- Neurological Exam


Neurological Exam: Alert, Awake, Oriented x3





- Psychiatric Exam


Psychiatric exam: Normal Affect, Normal Mood





- Skin


Skin Exam: Dry, Intact, Normal Color, Warm





Assessment and Plan





- Assessment and Plan (Free Text)


Assessment: 





67yo F with HTN, HLD and carcinoid s/p Lap Right hemicolectomy with primary 

side-to-side anastamosis. POD 4. Post-op course with diarrhea. 


Plan: 





- f/u GI recs


   - continue cholestyramine; Will need to titrate down the dose prior to 

discharge


   - Will consider dose of loperamide if needed


   - Octreotide if needed tomorrow if symptoms do not improve


- monitor bowel function


- Encourage OOBTC and ambulate


- Encourage IS use


- Continue diet





Further recs as per Dr. Brant Morrison PGY2


surgery

## 2019-02-16 NOTE — CP.PCM.DIS
Provider





- Provider


Date of Admission: 


02/11/19 15:30





Attending physician: 


Kwadwo Guo MD





Primary care physician: 


Hank Church MD





Consults: 








02/14/19 09:56


Gastroenterology Consult Routine 


   Comment: Diarrhea


   Consulting Provider: Gifty Lambert


   Consulting Physician: Gifty Lambert


   Reason for Consult: Diarrhea











Time Spent in preparation of Discharge (in minutes): 45





Hospital Course





- Lab Results


Lab Results: 


                                  Micro Results





02/13/19 10:25   Stool   C. difficile Antigen & Toxins A,B - Final





                             Most Recent Lab Values











WBC  6.9 10^3/uL (4.5-11.0)  D 02/16/19  07:20    


 


RBC  4.29 10^6/uL (3.5-6.1)   02/16/19  07:20    


 


Hgb  12.7 g/dL (12.0-16.0)   02/16/19  07:20    


 


Hct  37.7 % (36.0-48.0)   02/16/19  07:20    


 


MCV  87.9 fl (80.0-105.0)   02/16/19  07:20    


 


MCH  29.6 pg (25.0-35.0)   02/16/19  07:20    


 


MCHC  33.7 g/dl (31.0-37.0)   02/16/19  07:20    


 


RDW  13.0 % (11.5-14.5)   02/16/19  07:20    


 


Plt Count  263 10^3/uL (120.0-450.0)   02/16/19  07:20    


 


MPV  9.7 fl (7.0-11.0)   02/16/19  07:20    


 


Neut % (Auto)  57.3 % (50.0-68.0)   02/16/19  07:20    


 


Lymph % (Auto)  29.9 % (22.0-35.0)   02/16/19  07:20    


 


Mono % (Auto)  8.0 % (1.0-6.0)  H  02/16/19  07:20    


 


Eos % (Auto)  4.5 % (1.5-5.0)   02/16/19  07:20    


 


Baso % (Auto)  0.3 % (0.0-3.0)   02/16/19  07:20    


 


Lymph # (Auto)  2.1  (1.2-3.4)   02/16/19  07:20    


 


Mono # (Auto)  0.6  (0.1-0.6)   02/16/19  07:20    


 


Eos # (Auto)  0.3  (0.0-0.7)   02/16/19  07:20    


 


Baso # (Auto)  0.02 K/mm3 (0.0-2.0)   02/16/19  07:20    


 


Absolute Neuts (auto)  3.96  (1.4-6.5)   02/16/19  07:20    


 


Sodium  140 mmol/L (132-148)   02/16/19  07:20    


 


Potassium  3.5 mmol/L (3.6-5.0)  L  02/16/19  07:20    


 


Chloride  111 mmol/L ()  H  02/16/19  07:20    


 


Carbon Dioxide  19 mmol/L (21-33)  L  02/16/19  07:20    


 


Anion Gap  13  (10-20)   02/16/19  07:20    


 


BUN  13 mg/dL (7-21)   02/16/19  07:20    


 


Creatinine  0.7 mg/dl (0.7-1.2)   02/16/19  07:20    


 


Est GFR ( Amer)  > 60   02/16/19  07:20    


 


Est GFR (Non-Af Amer)  > 60   02/16/19  07:20    


 


Random Glucose  89 mg/dL ()   02/16/19  07:20    


 


Calcium  8.9 mg/dL (8.4-10.5)   02/16/19  07:20    


 


Total Bilirubin  0.7 mg/dL (0.2-1.3)   02/16/19  07:20    


 


AST  21 U/L (14-36)   02/16/19  07:20    


 


ALT  13 U/L (7-56)   02/16/19  07:20    


 


Alkaline Phosphatase  71 U/L ()   02/16/19  07:20    


 


Total Protein  7.1 g/dL (5.8-8.3)   02/16/19  07:20    


 


Albumin  3.8 g/dL (3.0-4.8)   02/16/19  07:20    


 


Globulin  3.3 gm/dL  02/16/19  07:20    


 


Albumin/Globulin Ratio  1.1  (1.1-1.8)   02/16/19  07:20    


 


Urine Color  Yellow  (YELLOW)   02/15/19  14:40    


 


Urine Appearance  Clear  (CLEAR)   02/15/19  14:40    


 


Urine pH  6.0  (4.7-8.0)   02/15/19  14:40    


 


Ur Specific Gravity  >= 1.030  (1.005-1.035)   02/15/19  14:40    


 


Urine Protein  Negative mg/dL (<30 mg/dL)   02/15/19  14:40    


 


Urine Glucose (UA)  Negative mg/dL (NEGATIVE)   02/15/19  14:40    


 


Urine Ketones  Negative mg/dL (NEGATIVE)   02/15/19  14:40    


 


Urine Blood  Small  (NEGATIVE)  H  02/15/19  14:40    


 


Urine Nitrate  Negative  (NEGATIVE)   02/15/19  14:40    


 


Urine Bilirubin  Negative  (NEGATIVE)   02/15/19  14:40    


 


Urine Urobilinogen  0.2 E.U./dL (<1 E.U./dL)   02/15/19  14:40    


 


Ur Leukocyte Esterase  Negative Mayra/uL (NEGATIVE)   02/15/19  14:40    


 


Urine RBC  10 - 15 /hpf (0-2)  H  02/15/19  14:40    


 


Urine WBC  2 - 5 /hpf (0-6)   02/15/19  14:40    


 


Ur Epithelial Cells  6 - 8 /hpf (0-5)  H  02/15/19  14:40    


 


Urine Bacteria  Trace /hpf (NONE)   02/15/19  14:40    


 


Blood Type  A POSITIVE   02/11/19  10:30    


 


Antibody Screen  Negative   02/11/19  10:30    


 


BBK History Checked  Patient has bt   02/11/19  10:30    














- Hospital Course


Hospital Course: 





69yo F with HTN and HLD who was brought into the hospital via same day surgery 

for right hemicolectomy for carcinoid. Post-operatively, patient started to have

diarrhea and required an extended stay for recovery. Patient was started on 

Cholestyramine and her symptoms improved. Patient's incisions clean, dry and 

intact. GI consult was obtained who recommeded titrating down the dose of the 

cholestyramine. Patient was cleared for discharge home with prescriptions for 

Cholestyramine, Loperamide prn, and probiotic. Discharge instructions had with 

patient who understands and agrees with plan. Patient agrees to follow up with 

Dr. Guo in office. 





Discharge Exam





- Head Exam


Head Exam: ATRAUMATIC, NORMAL INSPECTION, NORMOCEPHALIC





- Eye Exam


Eye Exam: EOMI, Normal appearance.  absent: Scleral icterus





- ENT Exam


ENT Exam: Mucous Membranes Moist





- Respiratory Exam


Respiratory Exam: NORMAL BREATHING PATTERN, UNREMARKABLE.  absent: Accessory 

Muscle Use, Respiratory Distress





- Cardiovascular Exam


Cardiovascular Exam: absent: RRR





- GI/Abdominal Exam


GI & Abdominal Exam: Soft.  absent: Distended, Guarding, Rebound, Tenderness





- Extremities Exam


Extremities exam: normal inspection





- Neurological Exam


Neurological exam: Alert, Oriented x3





- Psychiatric Exam


Psychiatric exam: Normal Affect, Normal Mood





- Skin


Skin Exam: Dry, Intact, Normal Color, Warm





Discharge Plan





- Discharge Medications


Prescriptions: 


Cholestyramine [Questran] 4 gm PO DAILY #10 packet


Lactobacillus Acidophilus [Bacid Acidophilus] 1 cap PO BID #14 cap


Loperamide [Loperamide HCl] 2 mg PO Q6 PRN #10 cap


 PRN Reason: Diarrhea





- Follow Up Plan


Condition: GOOD


Disposition: HOME/ ROUTINE


Additional Instructions: 


1. Resume home meds


2. Cholestyramine once daily


3. Use imodium as directed only if needed


4. Follow up with Dr. Guo in 7-10 days. Call for appointment.


5. No heavy lifting for 4-6 weeks


6. Okay to shower. Do not soak in pools or tubs.


7. Return to the ED with any concerning symptoms


Referrals: 


Hank Church MD [Primary Care Provider] - 


Kwadwo Guo MD [Staff Provider] - 


Gifty Lambert MD [Medical Doctor] -

## 2019-02-16 NOTE — CP.PCM.PN
Subjective





- Date & Time of Evaluation


Date of Evaluation: 02/16/19


Time of Evaluation: 11:53





- Subjective


Subjective: 


Gastroenterology Fellow/PGY6 Progress Note





Patient feels well. Tolerating regular diet. Notes improving diarrhea frequency 

to five times yesterday from prior note of every twenty minutes. A 12-point 

review of systems negative except for as above.








Objective





- Vital Signs/Intake and Output


Vital Signs (last 24 hours): 


                                        











Temp Pulse Resp BP Pulse Ox


 


 98.2 F   82   18   132/80   94 L


 


 02/16/19 08:32  02/16/19 10:45  02/16/19 08:32  02/16/19 10:45  02/16/19 08:32








Intake and Output: 


                                        











 02/16/19 02/16/19





 06:59 18:59


 


Intake Total 360 


 


Balance 360 














- Medications


Medications: 


                               Current Medications





Acetaminophen (Tylenol 325mg Tab)  650 mg PO Q6H PRN


   PRN Reason: Fever >100.4 F


   Last Admin: 02/15/19 03:49 Dose:  650 mg


Amlodipine Besylate (Norvasc)  5 mg PO QAM [facility]


   Last Admin: 02/16/19 10:45 Dose:  5 mg


Benzocaine/Menthol (Cepacol Sore Throat)  1 derik MT Q4H PRN


   PRN Reason: Sore Throat


   Last Admin: 02/12/19 09:50 Dose:  1 derik


Cholestyramine Resin (Questran)  4 gm PO TID [facility]


   Last Admin: 02/16/19 10:45 Dose:  4 gm


Ketorolac Tromethamine (Toradol)  15 mg IVP Q6 PRN


   PRN Reason: Pain, moderate (4-7)


Lactobacillus Acidophilus (Bacid Acidophilus)  1 cap PO BID [facility]


   Last Admin: 02/16/19 10:45 Dose:  1 cap











- Labs


Labs: 


                                        





                                 02/16/19 07:20 





                                 02/16/19 07:20 











- Constitutional


Appears: Non-toxic, No Acute Distress





- Head Exam


Head Exam: ATRAUMATIC, NORMOCEPHALIC





- Eye Exam


Eye Exam: EOMI, PERRL.  absent: Scleral icterus


Pupil Exam: PERRL.  absent: Miosis, Mydriatic





- ENT Exam


ENT Exam: Mucous Membranes Moist, Normal Oropharynx





- Neck Exam


Neck Exam: Full ROM, Normal Inspection





- Respiratory Exam


Respiratory Exam: Clear to Ausculation Bilateral.  absent: Rales, Rhonchi, 

Wheezes





- Cardiovascular Exam


Cardiovascular Exam: RRR, +S1, +S2.  absent: Gallop, Rubs





- GI/Abdominal Exam


GI & Abdominal Exam: Soft, Normal Bowel Sounds.  absent: Distended, Firm, 

Guarding, Rigid, Tenderness, Organomegaly, Rebound


Additional comments: 


umbilical, LUQ and LLQ surgical incision sites C/D/I








- Extremities Exam


Extremities Exam: Normal Inspection.  absent: Pedal Edema





- Neurological Exam


Neurological Exam: Alert, Awake





- Psychiatric Exam


Psychiatric exam: Normal Affect, Normal Mood





- Skin


Skin Exam: Dry, Intact, Normal Color, Warm





Assessment and Plan





- Assessment and Plan (Free Text)


Assessment: 


68 year old female with PMH of HTN and HLD presenting for surgical intervention 

of previously diagnosed terminal ileum carcinoid tumor POD5 (2/12/19) right 

hemicolectomy complicated by post-operative diarrhea.





Plan: 





-improving diarrhea


-continue cholestyramine powder


-patient counselled on titration of powder to stool consistency


-on Probiotic


-tolerating regular diet


-counselled on outpatient follow up with Dr. Lambert on discharge





Case discussed with Dr. Lambert